# Patient Record
Sex: FEMALE | Race: WHITE | NOT HISPANIC OR LATINO | Employment: FULL TIME | URBAN - METROPOLITAN AREA
[De-identification: names, ages, dates, MRNs, and addresses within clinical notes are randomized per-mention and may not be internally consistent; named-entity substitution may affect disease eponyms.]

---

## 2017-10-17 ENCOUNTER — CLINICAL SUPPORT (OUTPATIENT)
Dept: OTHER | Facility: CLINIC | Age: 27
End: 2017-10-17
Payer: COMMERCIAL

## 2017-10-17 DIAGNOSIS — Z00.8 HEALTH EXAMINATION IN POPULATION SURVEYS: Primary | ICD-10-CM

## 2017-10-17 PROCEDURE — 99401 PREV MED CNSL INDIV APPRX 15: CPT | Mod: S$GLB,,, | Performed by: INTERNAL MEDICINE

## 2017-10-17 PROCEDURE — 80061 LIPID PANEL: CPT | Mod: QW,S$GLB,, | Performed by: INTERNAL MEDICINE

## 2017-10-17 PROCEDURE — 82947 ASSAY GLUCOSE BLOOD QUANT: CPT | Mod: QW,S$GLB,, | Performed by: INTERNAL MEDICINE

## 2017-10-18 VITALS
BODY MASS INDEX: 19.66 KG/M2 | SYSTOLIC BLOOD PRESSURE: 116 MMHG | DIASTOLIC BLOOD PRESSURE: 76 MMHG | WEIGHT: 118 LBS | HEIGHT: 65 IN

## 2017-10-18 LAB
GLUCOSE SERPL-MCNC: NORMAL MG/DL (ref 60–140)
POC CHOLESTEROL, HDL: 74 MG/DL (ref 40–?)
POC CHOLESTEROL, LDL: 117 MG/DL (ref ?–160)
POC CHOLESTEROL, TOTAL: 203 MG/DL (ref ?–240)
POC GLUCOSE FASTING: 68 MG/DL (ref 60–110)
POC TOTAL CHOLESTEROL / HDL RATIO: 2.7 (ref ?–6)
POC TRIGLYCERIDES: 57 MG/DL (ref ?–160)

## 2019-07-21 ENCOUNTER — CLINICAL SUPPORT (OUTPATIENT)
Dept: URGENT CARE | Facility: CLINIC | Age: 29
End: 2019-07-21
Payer: COMMERCIAL

## 2019-07-21 VITALS
TEMPERATURE: 98 F | DIASTOLIC BLOOD PRESSURE: 81 MMHG | HEART RATE: 74 BPM | HEIGHT: 65 IN | WEIGHT: 116.19 LBS | SYSTOLIC BLOOD PRESSURE: 120 MMHG | BODY MASS INDEX: 19.36 KG/M2

## 2019-07-21 DIAGNOSIS — H10.9 CONJUNCTIVITIS OF BOTH EYES, UNSPECIFIED CONJUNCTIVITIS TYPE: Primary | ICD-10-CM

## 2019-07-21 PROCEDURE — 99214 PR OFFICE/OUTPT VISIT, EST, LEVL IV, 30-39 MIN: ICD-10-PCS | Mod: S$GLB,,, | Performed by: NURSE PRACTITIONER

## 2019-07-21 PROCEDURE — 99214 OFFICE O/P EST MOD 30 MIN: CPT | Mod: S$GLB,,, | Performed by: NURSE PRACTITIONER

## 2019-07-21 RX ORDER — ACETAMINOPHEN AND CODEINE PHOSPHATE 120; 12 MG/5ML; MG/5ML
1 SOLUTION ORAL DAILY
Status: ON HOLD | COMMUNITY
End: 2021-05-30 | Stop reason: HOSPADM

## 2019-07-21 RX ORDER — LORATADINE 10 MG/1
10 TABLET ORAL DAILY
COMMUNITY

## 2019-07-21 RX ORDER — LEVOTHYROXINE SODIUM 50 UG/1
50 TABLET ORAL DAILY
COMMUNITY

## 2019-07-21 RX ORDER — ERYTHROMYCIN 5 MG/G
OINTMENT OPHTHALMIC EVERY 4 HOURS
Qty: 1 TUBE | Refills: 0 | Status: ON HOLD | OUTPATIENT
Start: 2019-07-21 | End: 2021-05-30 | Stop reason: HOSPADM

## 2019-07-21 NOTE — PATIENT INSTRUCTIONS
Conjunctivitis, Bacterial    You have an infection in the membranes covering the white part of the eye. This part of the eye is called the conjunctiva. The infection is called conjunctivitis. The most common symptoms of conjunctivitis include a thick, pus-like discharge from the eye, swollen eyelids, redness, eyelids sticking together upon awakening, and a gritty or scratchy feeling in the eye. Your infection was caused by bacteria. It may be treated with medicine. With treatment, the infection takes about 7 to 10 days to resolve.  Home care  · Use prescribed antibiotic eye drops or ointment as directed to treat the infection.  · Apply a warm compress (towel soaked in warm water) to the affected eye 3 to 4 times a day. Do this just before applying medicine to the eye.  · Use a warm, wet cloth to wipe away crusting of the eyelids in the morning. This is caused by mucus drainage during the night. You may also use saline irrigating solution or artificial tears to rinse away mucus in the eye. Do not put a patch over the eye.  · Wash your hands before and after touching the infected eye. This is to prevent spreading the infection to the other eye, and to other people. Do not share your towels or washcloths with others.  · You may use acetaminophen or ibuprofen to control pain, unless another medicine was prescribed. (Note: If you have chronic liver or kidney disease or have ever had a stomach ulcer or gastrointestinal bleeding, talk with your doctor before using these medicines.)  · Do not wear contact lenses until your eyes have healed and all symptoms are gone.  Follow-up care  Follow up with your healthcare provider, or as advised.  When to seek medical advice  Call your healthcare provider right away if any of these occur:  · Worsening vision  · Increasing pain in the eye  · Increasing swelling or redness of the eyelid  · Redness spreading around the eye  Date Last Reviewed: 6/14/2015  © 5003-5405 [employer] [address] [place] [address], [place] [address]. All rights reserved. This information is not intended as a substitute for professional medical care. Always follow your healthcare professional's instructions.

## 2019-07-21 NOTE — PROGRESS NOTES
"Subjective:       Patient ID: Kendra Munoz is a 28 y.o. female.    Vitals:  height is 5' 5" (1.651 m) and weight is 52.7 kg (116 lb 3.2 oz). Her oral temperature is 97.8 °F (36.6 °C). Her blood pressure is 120/81 and her pulse is 74.     Chief Complaint: Eye Problem (bilateral eyelids puffy and irritated)    HPI    Constitution: Negative for fever.   HENT: Negative for sinus pain and sinus pressure.    Eyes: Positive for eye discharge, eye itching and eye redness. Negative for eye pain.   Respiratory: Negative for cough.    Gastrointestinal: Negative for abdominal pain.       Objective:      Physical Exam   Constitutional: She is oriented to person, place, and time. She appears well-developed and well-nourished.   HENT:   Mouth/Throat: Oropharynx is clear and moist.   Eyes:   Mild eyelid swelling to R upper lid. + conjunctival injection. No FB noted with lid eversion. NO FB sensation to suggest abrasion and Pt does not wear contacts.    Neck: Normal range of motion.   Cardiovascular: Normal rate.   Pulmonary/Chest: Effort normal.   Musculoskeletal: Normal range of motion.   Neurological: She is alert and oriented to person, place, and time.   Skin: Skin is warm.   Psychiatric: She has a normal mood and affect. Her behavior is normal.   Nursing note and vitals reviewed.      Assessment:       1. Conjunctivitis of both eyes, unspecified conjunctivitis type        Plan:         Conjunctivitis of both eyes, unspecified conjunctivitis type    Other orders  -     erythromycin (ROMYCIN) ophthalmic ointment; Place into both eyes every 4 (four) hours.  Dispense: 1 Tube; Refill: 0         "

## 2020-09-21 LAB
ABO + RH BLD: NORMAL
C TRACH RRNA SPEC QL PROBE: NEGATIVE
HBV SURFACE AG SERPL QL IA: NEGATIVE
HIV 1+2 AB+HIV1 P24 AG SERPL QL IA: NEGATIVE
INDIRECT COOMBS: NEGATIVE
N GONORRHOEAE, AMPLIFIED DNA: NEGATIVE
RPR: NORMAL
RUBELLA IMMUNE STATUS: NORMAL

## 2021-04-02 ENCOUNTER — HOSPITAL ENCOUNTER (OUTPATIENT)
Facility: HOSPITAL | Age: 31
Discharge: HOME | End: 2021-04-02
Attending: OBSTETRICS & GYNECOLOGY | Admitting: OBSTETRICS & GYNECOLOGY
Payer: COMMERCIAL

## 2021-04-02 VITALS — SYSTOLIC BLOOD PRESSURE: 111 MMHG | DIASTOLIC BLOOD PRESSURE: 65 MMHG | HEART RATE: 94 BPM

## 2021-04-02 DIAGNOSIS — O99.280 HYPOTHYROID IN PREGNANCY, ANTEPARTUM: ICD-10-CM

## 2021-04-02 DIAGNOSIS — E03.9 HYPOTHYROID IN PREGNANCY, ANTEPARTUM: ICD-10-CM

## 2021-04-02 PROCEDURE — 59025 FETAL NON-STRESS TEST: CPT

## 2021-04-06 ENCOUNTER — HOSPITAL ENCOUNTER (OUTPATIENT)
Facility: HOSPITAL | Age: 31
Discharge: HOME | End: 2021-04-06
Attending: OBSTETRICS & GYNECOLOGY | Admitting: OBSTETRICS & GYNECOLOGY
Payer: COMMERCIAL

## 2021-04-06 VITALS — RESPIRATION RATE: 18 BRPM | SYSTOLIC BLOOD PRESSURE: 124 MMHG | DIASTOLIC BLOOD PRESSURE: 61 MMHG | HEART RATE: 81 BPM

## 2021-04-06 DIAGNOSIS — E03.9 HYPOTHYROIDISM AFFECTING PREGNANCY: ICD-10-CM

## 2021-04-06 DIAGNOSIS — O99.280 HYPOTHYROIDISM AFFECTING PREGNANCY: ICD-10-CM

## 2021-04-06 PROCEDURE — 59025 FETAL NON-STRESS TEST: CPT

## 2021-04-08 ENCOUNTER — HOSPITAL ENCOUNTER (OUTPATIENT)
Facility: HOSPITAL | Age: 31
Discharge: HOME | End: 2021-04-08
Attending: OBSTETRICS & GYNECOLOGY | Admitting: OBSTETRICS & GYNECOLOGY
Payer: COMMERCIAL

## 2021-04-08 VITALS — HEART RATE: 98 BPM | DIASTOLIC BLOOD PRESSURE: 65 MMHG | SYSTOLIC BLOOD PRESSURE: 118 MMHG

## 2021-04-08 DIAGNOSIS — E03.9 HYPOTHYROID: ICD-10-CM

## 2021-04-08 PROCEDURE — 59025 FETAL NON-STRESS TEST: CPT

## 2021-04-13 ENCOUNTER — HOSPITAL ENCOUNTER (OUTPATIENT)
Facility: HOSPITAL | Age: 31
Discharge: HOME | End: 2021-04-13
Attending: OBSTETRICS & GYNECOLOGY | Admitting: OBSTETRICS & GYNECOLOGY
Payer: COMMERCIAL

## 2021-04-13 VITALS — SYSTOLIC BLOOD PRESSURE: 116 MMHG | HEART RATE: 81 BPM | DIASTOLIC BLOOD PRESSURE: 55 MMHG

## 2021-04-13 DIAGNOSIS — E03.9 HYPOTHYROID: ICD-10-CM

## 2021-04-13 PROCEDURE — 59025 FETAL NON-STRESS TEST: CPT

## 2021-04-16 ENCOUNTER — HOSPITAL ENCOUNTER (OUTPATIENT)
Facility: HOSPITAL | Age: 31
Discharge: HOME | End: 2021-04-16
Attending: OBSTETRICS & GYNECOLOGY | Admitting: OBSTETRICS & GYNECOLOGY
Payer: COMMERCIAL

## 2021-04-16 DIAGNOSIS — E03.9 HYPOTHYROID IN PREGNANCY, ANTEPARTUM, THIRD TRIMESTER: ICD-10-CM

## 2021-04-16 DIAGNOSIS — O99.283 HYPOTHYROID IN PREGNANCY, ANTEPARTUM, THIRD TRIMESTER: ICD-10-CM

## 2021-04-16 PROCEDURE — 59025 FETAL NON-STRESS TEST: CPT

## 2021-04-20 ENCOUNTER — HOSPITAL ENCOUNTER (OUTPATIENT)
Facility: HOSPITAL | Age: 31
Discharge: HOME | End: 2021-04-20
Attending: OBSTETRICS & GYNECOLOGY | Admitting: OBSTETRICS & GYNECOLOGY
Payer: COMMERCIAL

## 2021-04-20 VITALS — SYSTOLIC BLOOD PRESSURE: 109 MMHG | HEART RATE: 97 BPM | RESPIRATION RATE: 18 BRPM | DIASTOLIC BLOOD PRESSURE: 80 MMHG

## 2021-04-20 DIAGNOSIS — E03.9 HYPOTHYROIDISM: ICD-10-CM

## 2021-04-20 PROCEDURE — 59025 FETAL NON-STRESS TEST: CPT

## 2021-04-23 ENCOUNTER — HOSPITAL ENCOUNTER (OUTPATIENT)
Facility: HOSPITAL | Age: 31
Discharge: HOME | End: 2021-04-23
Attending: OBSTETRICS & GYNECOLOGY | Admitting: OBSTETRICS & GYNECOLOGY
Payer: COMMERCIAL

## 2021-04-23 VITALS — HEART RATE: 95 BPM | DIASTOLIC BLOOD PRESSURE: 64 MMHG | SYSTOLIC BLOOD PRESSURE: 110 MMHG

## 2021-04-23 DIAGNOSIS — E03.9 HYPOTHYROIDISM: ICD-10-CM

## 2021-04-23 PROCEDURE — 59025 FETAL NON-STRESS TEST: CPT

## 2021-04-27 ENCOUNTER — HOSPITAL ENCOUNTER (OUTPATIENT)
Facility: HOSPITAL | Age: 31
Discharge: HOME | End: 2021-04-27
Attending: OBSTETRICS & GYNECOLOGY | Admitting: OBSTETRICS & GYNECOLOGY
Payer: COMMERCIAL

## 2021-04-27 VITALS — SYSTOLIC BLOOD PRESSURE: 107 MMHG | DIASTOLIC BLOOD PRESSURE: 53 MMHG | HEART RATE: 82 BPM

## 2021-04-27 DIAGNOSIS — E03.9 HYPOTHYROID: ICD-10-CM

## 2021-04-27 PROCEDURE — 59025 FETAL NON-STRESS TEST: CPT

## 2021-04-30 ENCOUNTER — HOSPITAL ENCOUNTER (OUTPATIENT)
Facility: HOSPITAL | Age: 31
Discharge: HOME | End: 2021-04-30
Attending: OBSTETRICS & GYNECOLOGY | Admitting: OBSTETRICS & GYNECOLOGY
Payer: COMMERCIAL

## 2021-04-30 VITALS — DIASTOLIC BLOOD PRESSURE: 70 MMHG | RESPIRATION RATE: 18 BRPM | SYSTOLIC BLOOD PRESSURE: 124 MMHG | HEART RATE: 111 BPM

## 2021-04-30 DIAGNOSIS — O99.280 HYPOTHYROIDISM AFFECTING PREGNANCY: ICD-10-CM

## 2021-04-30 DIAGNOSIS — E03.9 HYPOTHYROIDISM AFFECTING PREGNANCY: ICD-10-CM

## 2021-04-30 PROCEDURE — 59025 FETAL NON-STRESS TEST: CPT

## 2021-05-04 ENCOUNTER — HOSPITAL ENCOUNTER (OUTPATIENT)
Facility: HOSPITAL | Age: 31
Discharge: HOME | End: 2021-05-04
Attending: OBSTETRICS & GYNECOLOGY | Admitting: OBSTETRICS & GYNECOLOGY
Payer: COMMERCIAL

## 2021-05-04 VITALS — HEART RATE: 102 BPM | DIASTOLIC BLOOD PRESSURE: 76 MMHG | SYSTOLIC BLOOD PRESSURE: 119 MMHG

## 2021-05-04 DIAGNOSIS — E03.9 HYPOTHYROID: ICD-10-CM

## 2021-05-04 PROCEDURE — 59025 FETAL NON-STRESS TEST: CPT

## 2021-05-07 ENCOUNTER — HOSPITAL ENCOUNTER (OUTPATIENT)
Facility: HOSPITAL | Age: 31
Discharge: HOME | End: 2021-05-07
Attending: OBSTETRICS & GYNECOLOGY | Admitting: OBSTETRICS & GYNECOLOGY
Payer: COMMERCIAL

## 2021-05-07 VITALS — DIASTOLIC BLOOD PRESSURE: 72 MMHG | SYSTOLIC BLOOD PRESSURE: 117 MMHG | RESPIRATION RATE: 16 BRPM | HEART RATE: 100 BPM

## 2021-05-07 DIAGNOSIS — O99.280 HYPOTHYROIDISM AFFECTING PREGNANCY: ICD-10-CM

## 2021-05-07 DIAGNOSIS — E03.9 HYPOTHYROIDISM AFFECTING PREGNANCY: ICD-10-CM

## 2021-05-07 PROCEDURE — 59025 FETAL NON-STRESS TEST: CPT

## 2021-05-10 ENCOUNTER — PATIENT MESSAGE (OUTPATIENT)
Dept: RESEARCH | Facility: HOSPITAL | Age: 31
End: 2021-05-10

## 2021-05-11 ENCOUNTER — HOSPITAL ENCOUNTER (OUTPATIENT)
Facility: HOSPITAL | Age: 31
Discharge: HOME | End: 2021-05-11
Attending: OBSTETRICS & GYNECOLOGY | Admitting: OBSTETRICS & GYNECOLOGY
Payer: COMMERCIAL

## 2021-05-11 VITALS — DIASTOLIC BLOOD PRESSURE: 58 MMHG | HEART RATE: 86 BPM | SYSTOLIC BLOOD PRESSURE: 104 MMHG

## 2021-05-11 DIAGNOSIS — E03.9 HYPOTHYROIDISM: ICD-10-CM

## 2021-05-11 PROCEDURE — 59025 FETAL NON-STRESS TEST: CPT

## 2021-05-14 ENCOUNTER — HOSPITAL ENCOUNTER (OUTPATIENT)
Facility: HOSPITAL | Age: 31
Discharge: HOME | End: 2021-05-14
Attending: OBSTETRICS & GYNECOLOGY | Admitting: OBSTETRICS & GYNECOLOGY
Payer: COMMERCIAL

## 2021-05-14 DIAGNOSIS — E03.9 HYPOTHYROID IN PREGNANCY, ANTEPARTUM, THIRD TRIMESTER: ICD-10-CM

## 2021-05-14 DIAGNOSIS — O99.283 HYPOTHYROID IN PREGNANCY, ANTEPARTUM, THIRD TRIMESTER: ICD-10-CM

## 2021-05-14 PROCEDURE — 59025 FETAL NON-STRESS TEST: CPT

## 2021-05-18 ENCOUNTER — HOSPITAL ENCOUNTER (OUTPATIENT)
Facility: HOSPITAL | Age: 31
Discharge: HOME | End: 2021-05-18
Attending: OBSTETRICS & GYNECOLOGY | Admitting: OBSTETRICS & GYNECOLOGY
Payer: COMMERCIAL

## 2021-05-18 VITALS — DIASTOLIC BLOOD PRESSURE: 82 MMHG | SYSTOLIC BLOOD PRESSURE: 131 MMHG | HEART RATE: 105 BPM

## 2021-05-18 DIAGNOSIS — E03.9 HYPOTHYROID: ICD-10-CM

## 2021-05-18 PROCEDURE — 59025 FETAL NON-STRESS TEST: CPT

## 2021-05-21 ENCOUNTER — HOSPITAL ENCOUNTER (OUTPATIENT)
Facility: HOSPITAL | Age: 31
Discharge: HOME | End: 2021-05-21
Attending: OBSTETRICS & GYNECOLOGY | Admitting: OBSTETRICS & GYNECOLOGY
Payer: COMMERCIAL

## 2021-05-21 DIAGNOSIS — E03.9 HYPOTHYROID IN PREGNANCY, ANTEPARTUM: ICD-10-CM

## 2021-05-21 DIAGNOSIS — O99.280 HYPOTHYROID IN PREGNANCY, ANTEPARTUM: ICD-10-CM

## 2021-05-21 PROCEDURE — 59025 FETAL NON-STRESS TEST: CPT

## 2021-05-28 ENCOUNTER — HOSPITAL ENCOUNTER (INPATIENT)
Facility: HOSPITAL | Age: 31
LOS: 2 days | Discharge: HOME | End: 2021-05-30
Attending: OBSTETRICS & GYNECOLOGY | Admitting: OBSTETRICS & GYNECOLOGY
Payer: COMMERCIAL

## 2021-05-28 DIAGNOSIS — Z34.90 ENCOUNTER FOR ELECTIVE INDUCTION OF LABOR: ICD-10-CM

## 2021-05-28 LAB
ABO + RH BLD: NORMAL
AMPHET+METHAMPHET UR QL: NEGATIVE
BACTERIA #/AREA URNS HPF: ABNORMAL /HPF
BARBITURATES UR QL SCN>200 NG/ML: NEGATIVE
BASOPHILS # BLD AUTO: 0.04 K/UL (ref 0–0.2)
BASOPHILS NFR BLD: 0.7 % (ref 0–1.9)
BENZODIAZ UR QL SCN>200 NG/ML: NEGATIVE
BILIRUB UR QL STRIP: NEGATIVE
BLD GP AB SCN CELLS X3 SERPL QL: NORMAL
BLOOD GROUP ANTIBODIES SERPL: NORMAL
BUPRENORPHINE UR QL: NEGATIVE
BZE UR QL SCN: NEGATIVE
CANNABINOIDS UR QL SCN: NEGATIVE
CLARITY UR: ABNORMAL
COLOR UR: YELLOW
CREAT UR-MCNC: 37 MG/DL (ref 15–325)
DIFFERENTIAL METHOD: ABNORMAL
EOSINOPHIL # BLD AUTO: 0 K/UL (ref 0–0.5)
EOSINOPHIL NFR BLD: 0.7 % (ref 0–8)
ERYTHROCYTE [DISTWIDTH] IN BLOOD BY AUTOMATED COUNT: 12.9 % (ref 11.5–14.5)
GLUCOSE UR QL STRIP: NEGATIVE
HCT VFR BLD AUTO: 38.4 % (ref 37–48.5)
HGB BLD-MCNC: 13.1 G/DL (ref 12–16)
HGB UR QL STRIP: NEGATIVE
HYALINE CASTS #/AREA URNS LPF: 2 /LPF
IMM GRANULOCYTES # BLD AUTO: 0.04 K/UL (ref 0–0.04)
IMM GRANULOCYTES NFR BLD AUTO: 0.7 % (ref 0–0.5)
KETONES UR QL STRIP: NEGATIVE
LEUKOCYTE ESTERASE UR QL STRIP: ABNORMAL
LYMPHOCYTES # BLD AUTO: 1.3 K/UL (ref 1–4.8)
LYMPHOCYTES NFR BLD: 21.6 % (ref 18–48)
MCH RBC QN AUTO: 31.1 PG (ref 27–31)
MCHC RBC AUTO-ENTMCNC: 34.1 G/DL (ref 32–36)
MCV RBC AUTO: 91 FL (ref 82–98)
MICROSCOPIC COMMENT: ABNORMAL
MONOCYTES # BLD AUTO: 0.4 K/UL (ref 0.3–1)
MONOCYTES NFR BLD: 7 % (ref 4–15)
NEUTROPHILS # BLD AUTO: 4.2 K/UL (ref 1.8–7.7)
NEUTROPHILS NFR BLD: 69.3 % (ref 38–73)
NITRITE UR QL STRIP: NEGATIVE
NRBC BLD-RTO: 0 /100 WBC
OPIATES UR QL SCN: NEGATIVE
PCP UR QL SCN>25 NG/ML: NEGATIVE
PH UR STRIP: 7 [PH] (ref 5–8)
PLATELET # BLD AUTO: 167 K/UL (ref 150–450)
PMV BLD AUTO: 12 FL (ref 9.2–12.9)
PROT UR QL STRIP: NEGATIVE
RBC # BLD AUTO: 4.21 M/UL (ref 4–5.4)
RBC #/AREA URNS HPF: 0 /HPF (ref 0–4)
RPR SER QL: REACTIVE
RPR SER-TITR: ABNORMAL {TITER}
SARS-COV-2 RDRP RESP QL NAA+PROBE: NEGATIVE
SP GR UR STRIP: 1.01 (ref 1–1.03)
SQUAMOUS #/AREA URNS HPF: 5 /HPF
TOXICOLOGY INFORMATION: NORMAL
URN SPEC COLLECT METH UR: ABNORMAL
UROBILINOGEN UR STRIP-ACNC: NEGATIVE EU/DL
WBC # BLD AUTO: 6.11 K/UL (ref 3.9–12.7)
WBC #/AREA URNS HPF: 52 /HPF (ref 0–5)

## 2021-05-28 PROCEDURE — 86592 SYPHILIS TEST NON-TREP QUAL: CPT | Performed by: OBSTETRICS & GYNECOLOGY

## 2021-05-28 PROCEDURE — 86900 BLOOD TYPING SEROLOGIC ABO: CPT | Performed by: OBSTETRICS & GYNECOLOGY

## 2021-05-28 PROCEDURE — 87086 URINE CULTURE/COLONY COUNT: CPT | Performed by: OBSTETRICS & GYNECOLOGY

## 2021-05-28 PROCEDURE — 80307 DRUG TEST PRSMV CHEM ANLYZR: CPT | Performed by: OBSTETRICS & GYNECOLOGY

## 2021-05-28 PROCEDURE — 85025 COMPLETE CBC W/AUTO DIFF WBC: CPT | Performed by: OBSTETRICS & GYNECOLOGY

## 2021-05-28 PROCEDURE — 12000002 HC ACUTE/MED SURGE SEMI-PRIVATE ROOM

## 2021-05-28 PROCEDURE — 72200004 HC VAGINAL DELIVERY LEVEL I

## 2021-05-28 PROCEDURE — 86593 SYPHILIS TEST NON-TREP QUANT: CPT | Performed by: OBSTETRICS & GYNECOLOGY

## 2021-05-28 PROCEDURE — 25000003 PHARM REV CODE 250: Performed by: OBSTETRICS & GYNECOLOGY

## 2021-05-28 PROCEDURE — 86870 RBC ANTIBODY IDENTIFICATION: CPT | Performed by: OBSTETRICS & GYNECOLOGY

## 2021-05-28 PROCEDURE — U0002 COVID-19 LAB TEST NON-CDC: HCPCS | Performed by: OBSTETRICS & GYNECOLOGY

## 2021-05-28 PROCEDURE — 81001 URINALYSIS AUTO W/SCOPE: CPT | Performed by: OBSTETRICS & GYNECOLOGY

## 2021-05-28 PROCEDURE — 86780 TREPONEMA PALLIDUM: CPT | Performed by: OBSTETRICS & GYNECOLOGY

## 2021-05-28 PROCEDURE — 63600175 PHARM REV CODE 636 W HCPCS: Performed by: OBSTETRICS & GYNECOLOGY

## 2021-05-28 RX ORDER — OXYTOCIN-SODIUM CHLORIDE 0.9% IV SOLN 30 UNIT/500ML 30-0.9/5 UT/ML-%
0-20 SOLUTION INTRAVENOUS CONTINUOUS
Status: DISCONTINUED | OUTPATIENT
Start: 2021-05-28 | End: 2021-05-28

## 2021-05-28 RX ORDER — IBUPROFEN 400 MG/1
800 TABLET ORAL EVERY 6 HOURS PRN
Status: DISCONTINUED | OUTPATIENT
Start: 2021-05-28 | End: 2021-05-30 | Stop reason: HOSPADM

## 2021-05-28 RX ORDER — ACETAMINOPHEN 325 MG/1
650 TABLET ORAL EVERY 6 HOURS PRN
Status: DISCONTINUED | OUTPATIENT
Start: 2021-05-28 | End: 2021-05-30 | Stop reason: HOSPADM

## 2021-05-28 RX ORDER — SODIUM CHLORIDE, SODIUM LACTATE, POTASSIUM CHLORIDE, CALCIUM CHLORIDE 600; 310; 30; 20 MG/100ML; MG/100ML; MG/100ML; MG/100ML
INJECTION, SOLUTION INTRAVENOUS CONTINUOUS
Status: DISCONTINUED | OUTPATIENT
Start: 2021-05-28 | End: 2021-05-28

## 2021-05-28 RX ORDER — PRENATAL WITH FERROUS FUM AND FOLIC ACID 3080; 920; 120; 400; 22; 1.84; 3; 20; 10; 1; 12; 200; 27; 25; 2 [IU]/1; [IU]/1; MG/1; [IU]/1; MG/1; MG/1; MG/1; MG/1; MG/1; MG/1; UG/1; MG/1; MG/1; MG/1; MG/1
1 TABLET ORAL DAILY
Status: DISCONTINUED | OUTPATIENT
Start: 2021-05-29 | End: 2021-05-30 | Stop reason: HOSPADM

## 2021-05-28 RX ORDER — OXYCODONE AND ACETAMINOPHEN 10; 325 MG/1; MG/1
1 TABLET ORAL EVERY 4 HOURS PRN
Status: DISCONTINUED | OUTPATIENT
Start: 2021-05-28 | End: 2021-05-30 | Stop reason: HOSPADM

## 2021-05-28 RX ORDER — OXYCODONE AND ACETAMINOPHEN 5; 325 MG/1; MG/1
1 TABLET ORAL EVERY 4 HOURS PRN
Status: DISCONTINUED | OUTPATIENT
Start: 2021-05-28 | End: 2021-05-30 | Stop reason: HOSPADM

## 2021-05-28 RX ORDER — AMOXICILLIN 250 MG
1 CAPSULE ORAL NIGHTLY
Status: DISCONTINUED | OUTPATIENT
Start: 2021-05-28 | End: 2021-05-30 | Stop reason: HOSPADM

## 2021-05-28 RX ORDER — DOCUSATE SODIUM 100 MG/1
200 CAPSULE, LIQUID FILLED ORAL 2 TIMES DAILY PRN
Status: DISCONTINUED | OUTPATIENT
Start: 2021-05-28 | End: 2021-05-30 | Stop reason: HOSPADM

## 2021-05-28 RX ORDER — PROCHLORPERAZINE EDISYLATE 5 MG/ML
5 INJECTION INTRAMUSCULAR; INTRAVENOUS EVERY 6 HOURS PRN
Status: DISCONTINUED | OUTPATIENT
Start: 2021-05-28 | End: 2021-05-30 | Stop reason: HOSPADM

## 2021-05-28 RX ORDER — ONDANSETRON 4 MG/1
8 TABLET, ORALLY DISINTEGRATING ORAL EVERY 8 HOURS PRN
Status: DISCONTINUED | OUTPATIENT
Start: 2021-05-28 | End: 2021-05-30 | Stop reason: HOSPADM

## 2021-05-28 RX ORDER — LEVOTHYROXINE SODIUM 25 UG/1
50 TABLET ORAL
Status: DISCONTINUED | OUTPATIENT
Start: 2021-05-29 | End: 2021-05-30 | Stop reason: HOSPADM

## 2021-05-28 RX ORDER — SODIUM CHLORIDE 0.9 % (FLUSH) 0.9 %
10 SYRINGE (ML) INJECTION
Status: DISCONTINUED | OUTPATIENT
Start: 2021-05-28 | End: 2021-05-30 | Stop reason: HOSPADM

## 2021-05-28 RX ORDER — CALCIUM CARBONATE 200(500)MG
500 TABLET,CHEWABLE ORAL 3 TIMES DAILY PRN
Status: DISCONTINUED | OUTPATIENT
Start: 2021-05-28 | End: 2021-05-28

## 2021-05-28 RX ORDER — ONDANSETRON 2 MG/ML
4 INJECTION INTRAMUSCULAR; INTRAVENOUS EVERY 6 HOURS PRN
Status: DISCONTINUED | OUTPATIENT
Start: 2021-05-28 | End: 2021-05-28

## 2021-05-28 RX ORDER — BUTORPHANOL TARTRATE 2 MG/ML
1 INJECTION INTRAMUSCULAR; INTRAVENOUS
Status: DISCONTINUED | OUTPATIENT
Start: 2021-05-28 | End: 2021-05-28

## 2021-05-28 RX ORDER — DIPHENHYDRAMINE HCL 25 MG
25 CAPSULE ORAL EVERY 4 HOURS PRN
Status: DISCONTINUED | OUTPATIENT
Start: 2021-05-28 | End: 2021-05-30 | Stop reason: HOSPADM

## 2021-05-28 RX ORDER — HYDROCORTISONE 25 MG/G
CREAM TOPICAL 3 TIMES DAILY PRN
Status: DISCONTINUED | OUTPATIENT
Start: 2021-05-28 | End: 2021-05-30 | Stop reason: HOSPADM

## 2021-05-28 RX ADMIN — Medication: at 06:05

## 2021-05-28 RX ADMIN — Medication 2 MILLI-UNITS/MIN: at 09:05

## 2021-05-28 RX ADMIN — SODIUM CHLORIDE, SODIUM LACTATE, POTASSIUM CHLORIDE, AND CALCIUM CHLORIDE: .6; .31; .03; .02 INJECTION, SOLUTION INTRAVENOUS at 09:05

## 2021-05-28 RX ADMIN — SENNOSIDES AND DOCUSATE SODIUM 1 TABLET: 8.6; 5 TABLET ORAL at 08:05

## 2021-05-28 RX ADMIN — BENZOCAINE AND LEVOMENTHOL: 200; 5 SPRAY TOPICAL at 06:05

## 2021-05-29 LAB
ABO + RH BLD: NORMAL
BASOPHILS # BLD AUTO: 0.03 K/UL (ref 0–0.2)
BASOPHILS NFR BLD: 0.3 % (ref 0–1.9)
BLD GP AB SCN CELLS X3 SERPL QL: NORMAL
DIFFERENTIAL METHOD: ABNORMAL
EOSINOPHIL # BLD AUTO: 0 K/UL (ref 0–0.5)
EOSINOPHIL NFR BLD: 0.4 % (ref 0–8)
ERYTHROCYTE [DISTWIDTH] IN BLOOD BY AUTOMATED COUNT: 12.8 % (ref 11.5–14.5)
FETAL CELL SCN BLD QL ROSETTE: NORMAL
HCT VFR BLD AUTO: 35.2 % (ref 37–48.5)
HGB BLD-MCNC: 11.9 G/DL (ref 12–16)
IMM GRANULOCYTES # BLD AUTO: 0.05 K/UL (ref 0–0.04)
IMM GRANULOCYTES NFR BLD AUTO: 0.5 % (ref 0–0.5)
INJECT RH IG VOL PATIENT: NORMAL ML
LYMPHOCYTES # BLD AUTO: 1.8 K/UL (ref 1–4.8)
LYMPHOCYTES NFR BLD: 16.6 % (ref 18–48)
MCH RBC QN AUTO: 31.1 PG (ref 27–31)
MCHC RBC AUTO-ENTMCNC: 33.8 G/DL (ref 32–36)
MCV RBC AUTO: 92 FL (ref 82–98)
MONOCYTES # BLD AUTO: 0.9 K/UL (ref 0.3–1)
MONOCYTES NFR BLD: 7.9 % (ref 4–15)
NEUTROPHILS # BLD AUTO: 8.2 K/UL (ref 1.8–7.7)
NEUTROPHILS NFR BLD: 74.3 % (ref 38–73)
NRBC BLD-RTO: 0 /100 WBC
PLATELET # BLD AUTO: 169 K/UL (ref 150–450)
PMV BLD AUTO: 11 FL (ref 9.2–12.9)
RBC # BLD AUTO: 3.83 M/UL (ref 4–5.4)
T PALLIDUM AB SER QL IF: NON REACTIVE
WBC # BLD AUTO: 10.96 K/UL (ref 3.9–12.7)

## 2021-05-29 PROCEDURE — 25000003 PHARM REV CODE 250: Performed by: OBSTETRICS & GYNECOLOGY

## 2021-05-29 PROCEDURE — 85025 COMPLETE CBC W/AUTO DIFF WBC: CPT | Performed by: OBSTETRICS & GYNECOLOGY

## 2021-05-29 PROCEDURE — 63600519 RHOGAM PHARM REV CODE 636 ALT 250 W HCPCS: Performed by: OBSTETRICS & GYNECOLOGY

## 2021-05-29 PROCEDURE — 36415 COLL VENOUS BLD VENIPUNCTURE: CPT | Performed by: OBSTETRICS & GYNECOLOGY

## 2021-05-29 PROCEDURE — 12000002 HC ACUTE/MED SURGE SEMI-PRIVATE ROOM

## 2021-05-29 PROCEDURE — 86900 BLOOD TYPING SEROLOGIC ABO: CPT | Performed by: OBSTETRICS & GYNECOLOGY

## 2021-05-29 PROCEDURE — 85461 HEMOGLOBIN FETAL: CPT | Performed by: OBSTETRICS & GYNECOLOGY

## 2021-05-29 RX ORDER — SILVER SULFADIAZINE 10 G/1000G
CREAM TOPICAL 2 TIMES DAILY
Status: DISCONTINUED | OUTPATIENT
Start: 2021-05-29 | End: 2021-05-30 | Stop reason: HOSPADM

## 2021-05-29 RX ADMIN — SENNOSIDES AND DOCUSATE SODIUM 1 TABLET: 8.6; 5 TABLET ORAL at 09:05

## 2021-05-29 RX ADMIN — SILVER SULFADIAZINE: 10 CREAM TOPICAL at 09:05

## 2021-05-29 RX ADMIN — DOCUSATE SODIUM 200 MG: 100 CAPSULE ORAL at 09:05

## 2021-05-29 RX ADMIN — LEVOTHYROXINE SODIUM 50 MCG: 25 TABLET ORAL at 05:05

## 2021-05-29 RX ADMIN — SILVER SULFADIAZINE: 10 CREAM TOPICAL at 01:05

## 2021-05-29 RX ADMIN — HUMAN RHO(D) IMMUNE GLOBULIN 300 MCG: 1500 SOLUTION INTRAMUSCULAR; INTRAVENOUS at 10:05

## 2021-05-30 VITALS
WEIGHT: 155 LBS | HEIGHT: 65 IN | OXYGEN SATURATION: 98 % | TEMPERATURE: 98 F | RESPIRATION RATE: 16 BRPM | SYSTOLIC BLOOD PRESSURE: 125 MMHG | BODY MASS INDEX: 25.83 KG/M2 | HEART RATE: 79 BPM | DIASTOLIC BLOOD PRESSURE: 75 MMHG

## 2021-05-30 LAB — BACTERIA UR CULT: NO GROWTH

## 2021-05-30 PROCEDURE — 25000003 PHARM REV CODE 250: Performed by: OBSTETRICS & GYNECOLOGY

## 2021-05-30 RX ORDER — IBUPROFEN 600 MG/1
600 TABLET ORAL EVERY 6 HOURS PRN
Qty: 30 TABLET | Refills: 0 | Status: SHIPPED | OUTPATIENT
Start: 2021-05-30

## 2021-05-30 RX ORDER — OXYCODONE AND ACETAMINOPHEN 5; 325 MG/1; MG/1
1-2 TABLET ORAL EVERY 4 HOURS PRN
Qty: 30 TABLET | Refills: 0 | Status: SHIPPED | OUTPATIENT
Start: 2021-05-30

## 2021-05-30 RX ADMIN — LEVOTHYROXINE SODIUM 50 MCG: 25 TABLET ORAL at 05:05

## 2021-05-30 RX ADMIN — PRENATAL VIT W/ FE FUMARATE-FA TAB 27-0.8 MG 1 TABLET: 27-0.8 TAB at 09:05

## 2021-05-30 RX ADMIN — SILVER SULFADIAZINE: 10 CREAM TOPICAL at 09:05

## 2025-02-25 ENCOUNTER — OFFICE VISIT (OUTPATIENT)
Dept: FAMILY MEDICINE | Facility: CLINIC | Age: 35
End: 2025-02-25
Payer: COMMERCIAL

## 2025-02-25 VITALS
HEART RATE: 78 BPM | HEIGHT: 65 IN | DIASTOLIC BLOOD PRESSURE: 62 MMHG | WEIGHT: 125.38 LBS | SYSTOLIC BLOOD PRESSURE: 96 MMHG | OXYGEN SATURATION: 100 % | BODY MASS INDEX: 20.89 KG/M2

## 2025-02-25 DIAGNOSIS — E03.9 ACQUIRED HYPOTHYROIDISM: ICD-10-CM

## 2025-02-25 DIAGNOSIS — Z79.899 HIGH RISK MEDICATION USE: ICD-10-CM

## 2025-02-25 DIAGNOSIS — Z00.00 PHYSICAL EXAM: Primary | ICD-10-CM

## 2025-02-25 PROCEDURE — 1159F MED LIST DOCD IN RCRD: CPT | Mod: CPTII,S$GLB,, | Performed by: NURSE PRACTITIONER

## 2025-02-25 PROCEDURE — 3078F DIAST BP <80 MM HG: CPT | Mod: CPTII,S$GLB,, | Performed by: NURSE PRACTITIONER

## 2025-02-25 PROCEDURE — 3074F SYST BP LT 130 MM HG: CPT | Mod: CPTII,S$GLB,, | Performed by: NURSE PRACTITIONER

## 2025-02-25 PROCEDURE — 1160F RVW MEDS BY RX/DR IN RCRD: CPT | Mod: CPTII,S$GLB,, | Performed by: NURSE PRACTITIONER

## 2025-02-25 PROCEDURE — 99214 OFFICE O/P EST MOD 30 MIN: CPT | Mod: S$GLB,,, | Performed by: NURSE PRACTITIONER

## 2025-02-25 PROCEDURE — 3008F BODY MASS INDEX DOCD: CPT | Mod: CPTII,S$GLB,, | Performed by: NURSE PRACTITIONER

## 2025-02-25 RX ORDER — FLUTICASONE PROPIONATE 50 MCG
1 SPRAY, SUSPENSION (ML) NASAL DAILY
COMMUNITY

## 2025-02-25 RX ORDER — NORETHINDRONE ACETATE/ETHINYL ESTRADIOL AND FERROUS FUMARATE 1MG-20(24)
1 KIT ORAL
COMMUNITY
Start: 2025-02-06

## 2025-02-26 LAB
ALBUMIN SERPL-MCNC: 4.5 G/DL (ref 3.6–5.1)
ALBUMIN/GLOB SERPL: 1.6 (CALC) (ref 1–2.5)
ALP SERPL-CCNC: 30 U/L (ref 31–125)
ALT SERPL-CCNC: 10 U/L (ref 6–29)
APPEARANCE UR: CLEAR
AST SERPL-CCNC: 13 U/L (ref 10–30)
BACTERIA #/AREA URNS HPF: NORMAL /HPF
BACTERIA UR CULT: NORMAL
BASOPHILS # BLD AUTO: 39 CELLS/UL (ref 0–200)
BASOPHILS NFR BLD AUTO: 0.7 %
BILIRUB SERPL-MCNC: 0.5 MG/DL (ref 0.2–1.2)
BILIRUB UR QL STRIP: NEGATIVE
BUN SERPL-MCNC: 13 MG/DL (ref 7–25)
BUN/CREAT SERPL: ABNORMAL (CALC) (ref 6–22)
CALCIUM SERPL-MCNC: 9.4 MG/DL (ref 8.6–10.2)
CHLORIDE SERPL-SCNC: 104 MMOL/L (ref 98–110)
CHOLEST SERPL-MCNC: 222 MG/DL
CHOLEST/HDLC SERPL: 2.4 (CALC)
CO2 SERPL-SCNC: 24 MMOL/L (ref 20–32)
COLOR UR: YELLOW
CREAT SERPL-MCNC: 0.75 MG/DL (ref 0.5–0.97)
EGFR: 107 ML/MIN/1.73M2
EOSINOPHIL # BLD AUTO: 99 CELLS/UL (ref 15–500)
EOSINOPHIL NFR BLD AUTO: 1.8 %
ERYTHROCYTE [DISTWIDTH] IN BLOOD BY AUTOMATED COUNT: 12.5 % (ref 11–15)
GLOBULIN SER CALC-MCNC: 2.8 G/DL (CALC) (ref 1.9–3.7)
GLUCOSE SERPL-MCNC: 82 MG/DL (ref 65–139)
GLUCOSE UR QL STRIP: NEGATIVE
HCT VFR BLD AUTO: 39.7 % (ref 35–45)
HDLC SERPL-MCNC: 91 MG/DL
HGB BLD-MCNC: 13.2 G/DL (ref 11.7–15.5)
HGB UR QL STRIP: NEGATIVE
HYALINE CASTS #/AREA URNS LPF: NORMAL /LPF
KETONES UR QL STRIP: NEGATIVE
LDLC SERPL CALC-MCNC: 113 MG/DL (CALC)
LEUKOCYTE ESTERASE UR QL STRIP: NEGATIVE
LYMPHOCYTES # BLD AUTO: 1914 CELLS/UL (ref 850–3900)
LYMPHOCYTES NFR BLD AUTO: 34.8 %
MCH RBC QN AUTO: 30.5 PG (ref 27–33)
MCHC RBC AUTO-ENTMCNC: 33.2 G/DL (ref 32–36)
MCV RBC AUTO: 91.7 FL (ref 80–100)
MONOCYTES # BLD AUTO: 391 CELLS/UL (ref 200–950)
MONOCYTES NFR BLD AUTO: 7.1 %
NEUTROPHILS # BLD AUTO: 3058 CELLS/UL (ref 1500–7800)
NEUTROPHILS NFR BLD AUTO: 55.6 %
NITRITE UR QL STRIP: NEGATIVE
NONHDLC SERPL-MCNC: 131 MG/DL (CALC)
PH UR STRIP: 7 [PH] (ref 5–8)
PLATELET # BLD AUTO: 237 THOUSAND/UL (ref 140–400)
PMV BLD REES-ECKER: 10.2 FL (ref 7.5–12.5)
POTASSIUM SERPL-SCNC: 4.2 MMOL/L (ref 3.5–5.3)
PROT SERPL-MCNC: 7.3 G/DL (ref 6.1–8.1)
PROT UR QL STRIP: NEGATIVE
RBC # BLD AUTO: 4.33 MILLION/UL (ref 3.8–5.1)
RBC #/AREA URNS HPF: NORMAL /HPF
SERVICE CMNT-IMP: NORMAL
SODIUM SERPL-SCNC: 139 MMOL/L (ref 135–146)
SP GR UR STRIP: 1.01 (ref 1–1.03)
SQUAMOUS #/AREA URNS HPF: NORMAL /HPF
TRIGL SERPL-MCNC: 81 MG/DL
TSH SERPL-ACNC: 1.3 MIU/L
WBC # BLD AUTO: 5.5 THOUSAND/UL (ref 3.8–10.8)
WBC #/AREA URNS HPF: NORMAL /HPF

## 2025-03-01 ENCOUNTER — RESULTS FOLLOW-UP (OUTPATIENT)
Dept: FAMILY MEDICINE | Facility: CLINIC | Age: 35
End: 2025-03-01

## 2025-03-01 NOTE — PROGRESS NOTES
SUBJECTIVE:    Patient ID: Kendra Munoz is a 34 y.o. female.    Chief Complaint: Establish Care (Bottles brought//Pt is here to get established//KE)    History of Present Illness    CHIEF COMPLAINT:  34 year old female  presents today as a new patient for establishment of care.    CURRENT SYMPTOMS:  She reports a palpable, sensitive lymph node in her neck with associated strain on the same side. She denies needing pain relief. She also reports amenorrhea with prior history of irregular menstruation.    MEDICAL HISTORY:  She has history of hypothyroidism diagnosed at age 16-17. She had one pregnancy 3-4 years ago without difficulties conceiving. She denies currently breastfeeding.    LABS:  Last thyroid labs was performed in November.    ALLERGIES:  She denies medication allergies but reports environmental allergies to dust and mold.    FAMILY HISTORY:  Her father has depression and alcoholism. Her mother and maternal grandfather have hypothyroidism. Maternal grandfather has lymphoma.    SOCIAL HISTORY:  She works as a , which involves prolonged standing. She sleeps approximately 7 hours per night and generally feels rested upon waking. She denies regular exercise. She reports occasional social alcohol consumption and denies use of street drugs, vaping, smoking, or chewing tobacco.    SURGICAL HISTORY:  She has history of wisdom teeth extraction.      ROS:  General: -fever, -chills, -fatigue, -weight gain, -weight loss  Eyes: -vision changes, -redness, -discharge  ENT: -ear pain, -nasal congestion, -sore throat  Cardiovascular: -chest pain, -palpitations, -lower extremity edema  Respiratory: -cough, -shortness of breath  Gastrointestinal: -abdominal pain, -nausea, -vomiting, -diarrhea, -constipation, -blood in stool  Genitourinary: -dysuria, -hematuria, -frequency  Musculoskeletal: -joint pain, -muscle pain, +neck pain  Skin: -rash, -lesion  Neurological: -headache, -dizziness, -numbness,  -tingling  Psychiatric: -anxiety, -depression, -sleep difficulty  Allergic: -allergic reactions         Office Visit on 02/25/2025   Component Date Value Ref Range Status    WBC 02/25/2025 5.5  3.8 - 10.8 Thousand/uL Final    RBC 02/25/2025 4.33  3.80 - 5.10 Million/uL Final    Hemoglobin 02/25/2025 13.2  11.7 - 15.5 g/dL Final    Hematocrit 02/25/2025 39.7  35.0 - 45.0 % Final    MCV 02/25/2025 91.7  80.0 - 100.0 fL Final    MCH 02/25/2025 30.5  27.0 - 33.0 pg Final    MCHC 02/25/2025 33.2  32.0 - 36.0 g/dL Final    RDW 02/25/2025 12.5  11.0 - 15.0 % Final    Platelets 02/25/2025 237  140 - 400 Thousand/uL Final    MPV 02/25/2025 10.2  7.5 - 12.5 fL Final    Neutrophils, Abs 02/25/2025 3,058  1,500 - 7,800 cells/uL Final    Lymph # 02/25/2025 1,914  850 - 3,900 cells/uL Final    Mono # 02/25/2025 391  200 - 950 cells/uL Final    Eos # 02/25/2025 99  15 - 500 cells/uL Final    Baso # 02/25/2025 39  0 - 200 cells/uL Final    Neutrophils Relative 02/25/2025 55.6  % Final    Lymph % 02/25/2025 34.8  % Final    Mono % 02/25/2025 7.1  % Final    Eosinophil % 02/25/2025 1.8  % Final    Basophil % 02/25/2025 0.7  % Final    Glucose 02/25/2025 82  65 - 139 mg/dL Final    BUN 02/25/2025 13  7 - 25 mg/dL Final    Creatinine 02/25/2025 0.75  0.50 - 0.97 mg/dL Final    eGFR 02/25/2025 107  > OR = 60 mL/min/1.73m2 Final    BUN/Creatinine Ratio 02/25/2025 SEE NOTE:  6 - 22 (calc) Final    Sodium 02/25/2025 139  135 - 146 mmol/L Final    Potassium 02/25/2025 4.2  3.5 - 5.3 mmol/L Final    Chloride 02/25/2025 104  98 - 110 mmol/L Final    CO2 02/25/2025 24  20 - 32 mmol/L Final    Calcium 02/25/2025 9.4  8.6 - 10.2 mg/dL Final    Total Protein 02/25/2025 7.3  6.1 - 8.1 g/dL Final    Albumin 02/25/2025 4.5  3.6 - 5.1 g/dL Final    Globulin, Total 02/25/2025 2.8  1.9 - 3.7 g/dL (calc) Final    Albumin/Globulin Ratio 02/25/2025 1.6  1.0 - 2.5 (calc) Final    Total Bilirubin 02/25/2025 0.5  0.2 - 1.2 mg/dL Final    Alkaline Phosphatase  02/25/2025 30 (L)  31 - 125 U/L Final    AST 02/25/2025 13  10 - 30 U/L Final    ALT 02/25/2025 10  6 - 29 U/L Final    TSH w/reflex to FT4 02/25/2025 1.30  mIU/L Final    Color, UA 02/25/2025 YELLOW  YELLOW Final    Appearance, UA 02/25/2025 CLEAR  CLEAR Final    Specific Gravity, UA 02/25/2025 1.007  1.001 - 1.035 Final    pH, UA 02/25/2025 7.0  5.0 - 8.0 Final    Glucose, UA 02/25/2025 NEGATIVE  NEGATIVE Final    Bilirubin, UA 02/25/2025 NEGATIVE  NEGATIVE Final    Ketones, UA 02/25/2025 NEGATIVE  NEGATIVE Final    Occult Blood UA 02/25/2025 NEGATIVE  NEGATIVE Final    Protein, UA 02/25/2025 NEGATIVE  NEGATIVE Final    Nitrite, UA 02/25/2025 NEGATIVE  NEGATIVE Final    Leukocytes, UA 02/25/2025 NEGATIVE  NEGATIVE Final    WBC Casts, UA 02/25/2025 NONE SEEN  < OR = 5 /HPF Final    RBC Casts, UA 02/25/2025 NONE SEEN  < OR = 2 /HPF Final    Squam Epithel, UA 02/25/2025 NONE SEEN  < OR = 5 /HPF Final    Bacteria, UA 02/25/2025 NONE SEEN  NONE SEEN /HPF Final    Hyaline Casts, UA 02/25/2025 NONE SEEN  NONE SEEN /LPF Final    Service Cmt: 02/25/2025 SEE COMMENT   Final    Reflexive Urine Culture 02/25/2025 SEE COMMENT   Final    Cholesterol 02/25/2025 222 (H)  <200 mg/dL Final    HDL 02/25/2025 91  > OR = 50 mg/dL Final    Triglycerides 02/25/2025 81  <150 mg/dL Final    LDL Cholesterol 02/25/2025 113 (H)  mg/dL (calc) Final    HDL/Cholesterol Ratio 02/25/2025 2.4  <5.0 (calc) Final    Non HDL Chol. (LDL+VLDL) 02/25/2025 131 (H)  <130 mg/dL (calc) Final       Past Medical History:   Diagnosis Date    Thyroid disease     hypothyroid     Social History[1]  Past Surgical History:   Procedure Laterality Date    WISDOM TOOTH EXTRACTION       Family History   Problem Relation Name Age of Onset    Alcohol abuse Father Ray     Depression Father Ray     Cancer Maternal Grandfather Sonny     Cancer Paternal Aunt Barbara        All of your core healthy metrics are met.      Review of patient's allergies indicates:  No Known  "Allergies  Current Medications[2]    Objective:      Vitals:    02/25/25 1124   BP: 96/62   Pulse: 78   SpO2: 100%   Weight: 56.9 kg (125 lb 6.4 oz)   Height: 5' 5" (1.651 m)     Physical Exam    General: No acute distress. Well-developed. Well-nourished.  Eyes: EOMI. Sclerae anicteric.  HENT: Normocephalic. Atraumatic. Nares patent. Moist oral mucosa.  Ears: Bilateral TMs clear. Bilateral EACs clear.  Cardiovascular: Regular rate. Regular rhythm. No murmurs. No rubs. No gallops.   Respiratory: Normal respiratory effort. Clear to auscultation bilaterally. No rales. No rhonchi. No wheezing.  Abdomen: Soft. Non-tender. Non-distended. Normoactive bowel sounds.  Musculoskeletal: No  obvious deformity.  Extremities: No lower extremity edema.  Neurological: Alert & oriented x3. No slurred speech. Normal gait.  Psychiatric: Normal mood. Normal affect. Good insight. Good judgment.  Skin: Warm. Dry. No rash.       Assessment:       Assessment & Plan    - Reviewed patient's history of hypothyroidism, diagnosed in adolescence  - Assessed lymph node in neck previously noted by Dr. Kamlesh Easley  - Determined lymph node to be soft, mobile, and not of immediate concern  - Considered ultrasound if lymph node enlarges or changes  - Planned to establish baseline thyroid function with labs    THYROID DISORDER:  - Continued thyroid medication at current dose of 50 mcg.  - Ordered labs including thyroid function tests.  - Scheduled follow up in 1 year for yearly thyroid check.      MENSTRUAL IRREGULARITIES:  - Noted the patient reports not having had a menstrual period in a very long time and had irregular periods before.  - Continued current birth control regimen.    ENVIRONMENTAL ALLERGIES:  - Documented patient's reported environmental allergies to dust and mold.  - Noted previous allergy testing.    LYMPHADENOPATHY:  - Examined the patient's reported swollen lymph node in the neck, confirming it's soft and more prominent on one " side.  - Advised the patient to monitor the lymph node.  - Recommend ultrasound if the lymph node starts increasing in size.    FAMILY HISTORY:  - Documented patient's report of father diagnosed with alcoholism.  - Noted patient's report of father diagnosed with depression.  - Noted patient's report of grandfather having lymphoma and undergoing radiation and chemotherapy.    LABS:  - Ordered labs including cholesterol and glucose tests.    FOLLOW UP:  - Advised the patient to return to lab anytime for labs if unable to complete today.  - Directed the patient to contact the office for medication refills through patient portal or by phone.       Plan:       Physical exam  Comments:  doing well  Orders:  -     CBC Auto Differential; Future; Expected date: 02/25/2025  -     Comprehensive Metabolic Panel; Future; Expected date: 02/25/2025  -     TSH w/reflex to FT4; Future; Expected date: 02/25/2025  -     Urinalysis, Reflex to Urine Culture Urine, Clean Catch; Future; Expected date: 02/25/2025  -     Lipid Panel; Future; Expected date: 02/25/2025    High risk medication use  -     CBC Auto Differential; Future; Expected date: 02/25/2025  -     Comprehensive Metabolic Panel; Future; Expected date: 02/25/2025  -     TSH w/reflex to FT4; Future; Expected date: 02/25/2025  -     Urinalysis, Reflex to Urine Culture Urine, Clean Catch; Future; Expected date: 02/25/2025  -     Lipid Panel; Future; Expected date: 02/25/2025    Acquired hypothyroidism  Comments:  labs to check thyroid      Follow up in about 1 year (around 2/25/2026), or if symptoms worsen or fail to improve, for medication management.        This note was generated with the assistance of ambient listening technology. Verbal consent was obtained by the patient and accompanying visitor(s) for the recording of patient appointment to facilitate this note. I attest to having reviewed and edited the generated note for accuracy, though some syntax or spelling errors  may persist. Please contact the author of this note for any clarification.      3/1/2025 Wendy Cuellar           [1]   Social History  Socioeconomic History    Marital status:    Tobacco Use    Smoking status: Never    Smokeless tobacco: Never   Substance and Sexual Activity    Alcohol use: Yes     Alcohol/week: 4.0 standard drinks of alcohol     Types: 4 Glasses of wine per week     Comment: occasionally    Drug use: Never    Sexual activity: Yes     Partners: Male     Birth control/protection: OCP   Social History Narrative    Sleepsabout 7 hours a night    No exercise     On feet most f day    Lmp:      Social Drivers of Health     Financial Resource Strain: Low Risk  (2/22/2025)    Overall Financial Resource Strain (CARDIA)     Difficulty of Paying Living Expenses: Not hard at all   Food Insecurity: No Food Insecurity (2/22/2025)    Hunger Vital Sign     Worried About Running Out of Food in the Last Year: Never true     Ran Out of Food in the Last Year: Never true   Transportation Needs: No Transportation Needs (2/22/2025)    PRAPARE - Transportation     Lack of Transportation (Medical): No     Lack of Transportation (Non-Medical): No   Physical Activity: Insufficiently Active (2/22/2025)    Exercise Vital Sign     Days of Exercise per Week: 1 day     Minutes of Exercise per Session: 20 min   Stress: No Stress Concern Present (2/22/2025)    [employer] - Occupational Stress Questionnaire     Feeling of Stress : Not at all   Housing Stability: Low Risk  (2/22/2025)    Housing Stability Vital Sign     Unable to Pay for Housing in the Last Year: No     Number of Times Moved in the Last Year: 0     Homeless in the Last Year: No   [2]   Current Outpatient Medications:     fluticasone propionate (FLONASE) 50 mcg/actuation nasal spray, 1 spray by Each Nostril route once daily., Disp: , Rfl:     DONA 24 FE 1 mg-20 mcg (24)/75 mg (4) per tablet, Take 1 tablet by mouth., Disp: , Rfl:      levothyroxine (SYNTHROID) 50 MCG tablet, Take 50 mcg by mouth once daily., Disp: , Rfl:

## 2025-04-21 ENCOUNTER — OFFICE VISIT (OUTPATIENT)
Dept: FAMILY MEDICINE | Facility: CLINIC | Age: 35
End: 2025-04-21
Payer: COMMERCIAL

## 2025-04-21 ENCOUNTER — TELEPHONE (OUTPATIENT)
Dept: FAMILY MEDICINE | Facility: CLINIC | Age: 35
End: 2025-04-21

## 2025-04-21 VITALS
TEMPERATURE: 98 F | WEIGHT: 122.63 LBS | BODY MASS INDEX: 20.43 KG/M2 | HEIGHT: 65 IN | SYSTOLIC BLOOD PRESSURE: 116 MMHG | DIASTOLIC BLOOD PRESSURE: 76 MMHG

## 2025-04-21 DIAGNOSIS — J40 BRONCHITIS: ICD-10-CM

## 2025-04-21 DIAGNOSIS — E03.9 ACQUIRED HYPOTHYROIDISM: Primary | ICD-10-CM

## 2025-04-21 PROCEDURE — 3078F DIAST BP <80 MM HG: CPT | Mod: CPTII,S$GLB,, | Performed by: NURSE PRACTITIONER

## 2025-04-21 PROCEDURE — 1160F RVW MEDS BY RX/DR IN RCRD: CPT | Mod: CPTII,S$GLB,, | Performed by: NURSE PRACTITIONER

## 2025-04-21 PROCEDURE — 3008F BODY MASS INDEX DOCD: CPT | Mod: CPTII,S$GLB,, | Performed by: NURSE PRACTITIONER

## 2025-04-21 PROCEDURE — 1159F MED LIST DOCD IN RCRD: CPT | Mod: CPTII,S$GLB,, | Performed by: NURSE PRACTITIONER

## 2025-04-21 PROCEDURE — 3074F SYST BP LT 130 MM HG: CPT | Mod: CPTII,S$GLB,, | Performed by: NURSE PRACTITIONER

## 2025-04-21 PROCEDURE — 99214 OFFICE O/P EST MOD 30 MIN: CPT | Mod: S$GLB,,, | Performed by: NURSE PRACTITIONER

## 2025-04-21 RX ORDER — ALBUTEROL SULFATE 90 UG/1
2 INHALANT RESPIRATORY (INHALATION) EVERY 6 HOURS PRN
Qty: 18 G | Refills: 0 | Status: SHIPPED | OUTPATIENT
Start: 2025-04-21 | End: 2026-04-21

## 2025-04-21 RX ORDER — AZITHROMYCIN 250 MG/1
TABLET, FILM COATED ORAL
Qty: 6 TABLET | Refills: 0 | Status: SHIPPED | OUTPATIENT
Start: 2025-04-21 | End: 2025-04-26

## 2025-04-21 RX ORDER — METHYLPREDNISOLONE 4 MG/1
TABLET ORAL
Qty: 21 EACH | Refills: 0 | Status: SHIPPED | OUTPATIENT
Start: 2025-04-21 | End: 2025-05-12

## 2025-04-21 NOTE — TELEPHONE ENCOUNTER
Patient has had a lingering cough since the beginning of April. Has a sore throat and a sinus drip. Has been taking mucinex with no relief. Started having some chest tightness yesterday.   Would like to be seen today.

## 2025-05-01 NOTE — PROGRESS NOTES
SUBJECTIVE:    Patient ID: Kendra Munoz is a 34 y.o. female.    Chief Complaint: Cough (Cough x 3 weeks productive some yellowish no clearing up, chest tightness all day yesterday, no fever, no n/v/d, no chills no body aches, some sinus pressure, no headaches/ taking Mucinex D// mp)      History of Present Illness    CHIEF COMPLAINT:  Kendra presents today for persistent cough since early April.    HISTORY OF PRESENT ILLNESS:  She reports cough initially began with throat tickle and hoarseness affecting speech. The cough was initially productive with green and yellow sputum but has since cleared to normal color. She experiences coughing fits triggered by deep breathing, laughing, or singing, accompanied by shortness of breath. She also notes recent onset of chest tightness and pressure. Symptoms worsen at night causing sleep disruption, and she has attempted sleeping propped up on pillows for relief. She denies fever. She reports possible COVID-19 exposure at school but denies knowledge of specific contact with infected individuals.    MEDICATIONS:  She initially tried Claritin, then switched to Mucinex without symptomatic improvement.      ROS:  General: -fever, -chills, -fatigue, -weight gain, -weight loss  Eyes: -vision changes, -redness, -discharge  ENT: -ear pain, -nasal congestion, -sore throat, +hoarseness, +sense of lump/mass in throat when swallowing  Cardiovascular: -chest pain, -palpitations, -lower extremity edema  Respiratory: +cough, +shortness of breath, +productive cough, +waking at night coughing, +wheezing, +chest pressure, +chest tightness  Gastrointestinal: -abdominal pain, -nausea, -vomiting, -diarrhea, -constipation, -blood in stool  Genitourinary: -dysuria, -hematuria, -frequency  Musculoskeletal: -joint pain, -muscle pain  Skin: -rash, -lesion  Neurological: -headache, -dizziness, -numbness, -tingling  Psychiatric: -anxiety, -depression, -sleep difficulty              Office Visit on  02/25/2025   Component Date Value Ref Range Status    WBC 02/25/2025 5.5  3.8 - 10.8 Thousand/uL Final    RBC 02/25/2025 4.33  3.80 - 5.10 Million/uL Final    Hemoglobin 02/25/2025 13.2  11.7 - 15.5 g/dL Final    Hematocrit 02/25/2025 39.7  35.0 - 45.0 % Final    MCV 02/25/2025 91.7  80.0 - 100.0 fL Final    MCH 02/25/2025 30.5  27.0 - 33.0 pg Final    MCHC 02/25/2025 33.2  32.0 - 36.0 g/dL Final    RDW 02/25/2025 12.5  11.0 - 15.0 % Final    Platelets 02/25/2025 237  140 - 400 Thousand/uL Final    MPV 02/25/2025 10.2  7.5 - 12.5 fL Final    Neutrophils, Abs 02/25/2025 3,058  1,500 - 7,800 cells/uL Final    Lymph # 02/25/2025 1,914  850 - 3,900 cells/uL Final    Mono # 02/25/2025 391  200 - 950 cells/uL Final    Eos # 02/25/2025 99  15 - 500 cells/uL Final    Baso # 02/25/2025 39  0 - 200 cells/uL Final    Neutrophils Relative 02/25/2025 55.6  % Final    Lymph % 02/25/2025 34.8  % Final    Mono % 02/25/2025 7.1  % Final    Eosinophil % 02/25/2025 1.8  % Final    Basophil % 02/25/2025 0.7  % Final    Glucose 02/25/2025 82  65 - 139 mg/dL Final    BUN 02/25/2025 13  7 - 25 mg/dL Final    Creatinine 02/25/2025 0.75  0.50 - 0.97 mg/dL Final    eGFR 02/25/2025 107  > OR = 60 mL/min/1.73m2 Final    BUN/Creatinine Ratio 02/25/2025 SEE NOTE:  6 - 22 (calc) Final    Sodium 02/25/2025 139  135 - 146 mmol/L Final    Potassium 02/25/2025 4.2  3.5 - 5.3 mmol/L Final    Chloride 02/25/2025 104  98 - 110 mmol/L Final    CO2 02/25/2025 24  20 - 32 mmol/L Final    Calcium 02/25/2025 9.4  8.6 - 10.2 mg/dL Final    Total Protein 02/25/2025 7.3  6.1 - 8.1 g/dL Final    Albumin 02/25/2025 4.5  3.6 - 5.1 g/dL Final    Globulin, Total 02/25/2025 2.8  1.9 - 3.7 g/dL (calc) Final    Albumin/Globulin Ratio 02/25/2025 1.6  1.0 - 2.5 (calc) Final    Total Bilirubin 02/25/2025 0.5  0.2 - 1.2 mg/dL Final    Alkaline Phosphatase 02/25/2025 30 (L)  31 - 125 U/L Final    AST 02/25/2025 13  10 - 30 U/L Final    ALT 02/25/2025 10  6 - 29 U/L Final     TSH w/reflex to FT4 02/25/2025 1.30  mIU/L Final    Color, UA 02/25/2025 YELLOW  YELLOW Final    Appearance, UA 02/25/2025 CLEAR  CLEAR Final    Specific Gravity, UA 02/25/2025 1.007  1.001 - 1.035 Final    pH, UA 02/25/2025 7.0  5.0 - 8.0 Final    Glucose, UA 02/25/2025 NEGATIVE  NEGATIVE Final    Bilirubin, UA 02/25/2025 NEGATIVE  NEGATIVE Final    Ketones, UA 02/25/2025 NEGATIVE  NEGATIVE Final    Occult Blood UA 02/25/2025 NEGATIVE  NEGATIVE Final    Protein, UA 02/25/2025 NEGATIVE  NEGATIVE Final    Nitrite, UA 02/25/2025 NEGATIVE  NEGATIVE Final    Leukocytes, UA 02/25/2025 NEGATIVE  NEGATIVE Final    WBC Casts, UA 02/25/2025 NONE SEEN  < OR = 5 /HPF Final    RBC Casts, UA 02/25/2025 NONE SEEN  < OR = 2 /HPF Final    Squam Epithel, UA 02/25/2025 NONE SEEN  < OR = 5 /HPF Final    Bacteria, UA 02/25/2025 NONE SEEN  NONE SEEN /HPF Final    Hyaline Casts, UA 02/25/2025 NONE SEEN  NONE SEEN /LPF Final    Service Cmt: 02/25/2025 SEE COMMENT   Final    Reflexive Urine Culture 02/25/2025 SEE COMMENT   Final    Cholesterol 02/25/2025 222 (H)  <200 mg/dL Final    HDL 02/25/2025 91  > OR = 50 mg/dL Final    Triglycerides 02/25/2025 81  <150 mg/dL Final    LDL Cholesterol 02/25/2025 113 (H)  mg/dL (calc) Final    HDL/Cholesterol Ratio 02/25/2025 2.4  <5.0 (calc) Final    Non HDL Chol. (LDL+VLDL) 02/25/2025 131 (H)  <130 mg/dL (calc) Final       Past Medical History:   Diagnosis Date    Thyroid disease     hypothyroid     Past Surgical History:   Procedure Laterality Date    WISDOM TOOTH EXTRACTION       Family History   Problem Relation Name Age of Onset    Alcohol abuse Father Ray     Depression Father Ray     Cancer Maternal Grandfather Sonny     Cancer Paternal Aunt Barbara        All of your core healthy metrics are met.      The 10-year CVD risk score (D'Agostino, et al., 2008) is: 1.1%    Values used to calculate the score:      Age: 34 years      Sex: Female      Diabetic: No      Tobacco smoker: No      Systolic  "Blood Pressure: 116 mmHg      Is BP treated: No      HDL Cholesterol: 91 mg/dL      Total Cholesterol: 222 mg/dL     Marital Status:   Alcohol History:  reports current alcohol use of about 4.0 standard drinks of alcohol per week.  Tobacco History:  reports that she has never smoked. She has never used smokeless tobacco.  Drug History:  reports no history of drug use.    Health Maintenance Topics with due status: Not Due       Topic Last Completion Date    TETANUS VACCINE 03/05/2021    Cervical Cancer Screening 11/01/2023    RSV Vaccine (Age 60+ and Pregnant patients) Not Due     Immunization History   Administered Date(s) Administered    Rho (D) Immune Globulin - IM 05/29/2021       Review of patient's allergies indicates:  No Known Allergies  Current Medications[1]        Objective:      Vitals:    04/21/25 1032   BP: 116/76   Temp: 98.3 °F (36.8 °C)   Weight: 55.6 kg (122 lb 9.6 oz)   Height: 5' 5" (1.651 m)       Physical Exam  Vitals and nursing note reviewed.   Constitutional:       Appearance: Normal appearance. She is well-developed. She is not ill-appearing.   HENT:      Right Ear: External ear normal.      Left Ear: External ear normal.      Nose: Congestion and rhinorrhea present.      Mouth/Throat:      Tonsils: No tonsillar exudate.   Cardiovascular:      Rate and Rhythm: Normal rate and regular rhythm.      Heart sounds: Normal heart sounds.   Pulmonary:      Breath sounds: Wheezing present.   Lymphadenopathy:      Cervical: No cervical adenopathy.   Neurological:      Mental Status: She is alert.            Assessment:       1. Acquired hypothyroidism    2. Bronchitis           Assessment & Plan    - Assessed persistent cough since early April, initially with green/yellow sputum, now clearer.  - Noted swollen glands and tightness in lower respiratory tract.  - Considered possibility of post-viral syndrome or progression to secondary condition.  - Ruled out need for COVID and flu testing due to " duration of symptoms.  - Determined need for breathing treatment to address wheezing and airway tightness.    ACUTE COUGH:  - Monitor the patient's cough, which started in the first week of April, initially with green and yellow sputum, now more normal color.  - Note that the cough persists with coughing fits, worsening at night and causing the patient to wake up.  - Evaluate the patient's report of phlegm in throat and tickling sensation, with coughing fits occurring during deep breaths, laughing, or singing.  - Consider the possibility of COVID or flu, but refrain from testing due to the duration of symptoms.  - Administer a breathing treatment in the office.  - Consider performing a chest XR depending on the response to the breathing treatment.    SHORTNESS OF BREATH:  - Note that the patient experiences difficulty catching breath during coughing fits.  - Observe that the patient is slightly tight in the lower respiratory area, possibly due to wheezing.  - Administer a breathing treatment in the office to open up the airway.  - Consider performing a chest XR depending on the response to the breathing treatment.    CHEST PAIN:  - Monitor the patient's report of recent onset of chest tightness and pressure.  - Consider that the chest tightness could be related to wheezing.  - Reassess after the breathing treatment.  - Consider performing a chest XR depending on the response to the breathing treatment.    DYSPHONIA:  - Note that patient reports hoarseness and strain when talking at the onset of symptoms.    ENLARGED LYMPH NODES:  - Observe swollen glands during exam.    SLEEP DISORDER:  - Monitor the patient's report of waking up in the middle of the night due to coughing fits.  - Note that the patient tried propping up on pillows to alleviate nighttime symptoms.    EXPOSURE TO COVID-19:  - Monitor the patient's report of possible exposure to illnesses at school, but no confirmed COVID-19 contacts.  - Consider the  possibility of COVID-19 but refrain from testing due to the duration of symptoms.    FOLLOW-UP:  - Schedule a follow-up after the breathing treatment.        Plan:       1. Acquired hypothyroidism    2. Bronchitis  Comments:  albuterol q6, zithromax , medrol    Other orders  -     methylPREDNISolone (MEDROL DOSEPACK) 4 mg tablet; use as directed  Dispense: 21 each; Refill: 0  -     albuterol (VENTOLIN HFA) 90 mcg/actuation inhaler; Inhale 2 puffs into the lungs every 6 (six) hours as needed for Wheezing. Rescue  Dispense: 18 g; Refill: 0  -     azithromycin (Z-KATIE) 250 MG tablet; Take 2 tablets by mouth on day 1; Take 1 tablet by mouth on days 2-5  Dispense: 6 tablet; Refill: 0      Follow up if symptoms worsen or fail to improve, for medication management.          Counseled on age and gender appropriate medical preventative services, including cancer screenings, immunizations, overall nutritional health, need for a consistent exercise regimen and an overall push towards maintaining a vigorous and active lifestyle.      This note was generated with the assistance of ambient listening technology. Verbal consent was obtained by the patient and accompanying visitor(s) for the recording of patient appointment to facilitate this note. I attest to having reviewed and edited the generated note for accuracy, though some syntax or spelling errors may persist. Please contact the author of this note for any clarification.       5/1/2025 Wendy Cuellar NP         [1]   Current Outpatient Medications:     fluticasone propionate (FLONASE) 50 mcg/actuation nasal spray, 1 spray by Each Nostril route once daily., Disp: , Rfl:     DONA 24 FE 1 mg-20 mcg (24)/75 mg (4) per tablet, Take 1 tablet by mouth., Disp: , Rfl:     levothyroxine (SYNTHROID) 50 MCG tablet, Take 50 mcg by mouth once daily., Disp: , Rfl:     albuterol (VENTOLIN HFA) 90 mcg/actuation inhaler, Inhale 2 puffs into the lungs every 6 (six) hours as needed for Wheezing.  Rescue, Disp: 18 g, Rfl: 0    methylPREDNISolone (MEDROL DOSEPACK) 4 mg tablet, use as directed, Disp: 21 each, Rfl: 0